# Patient Record
Sex: FEMALE | Race: ASIAN | NOT HISPANIC OR LATINO
[De-identification: names, ages, dates, MRNs, and addresses within clinical notes are randomized per-mention and may not be internally consistent; named-entity substitution may affect disease eponyms.]

---
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
## 2024-05-17 ENCOUNTER — APPOINTMENT (OUTPATIENT)
Dept: PEDIATRICS | Facility: CLINIC | Age: 19
End: 2024-05-17
Payer: COMMERCIAL

## 2024-05-17 VITALS
HEIGHT: 62.5 IN | TEMPERATURE: 99.9 F | HEART RATE: 84 BPM | WEIGHT: 183 LBS | OXYGEN SATURATION: 99 % | BODY MASS INDEX: 32.83 KG/M2

## 2024-05-17 DIAGNOSIS — J02.9 ACUTE PHARYNGITIS, UNSPECIFIED: ICD-10-CM

## 2024-05-17 DIAGNOSIS — F48.9 NONPSYCHOTIC MENTAL DISORDER, UNSPECIFIED: ICD-10-CM

## 2024-05-17 DIAGNOSIS — J02.0 STREPTOCOCCAL PHARYNGITIS: ICD-10-CM

## 2024-05-17 DIAGNOSIS — J32.9 CHRONIC SINUSITIS, UNSPECIFIED: ICD-10-CM

## 2024-05-17 DIAGNOSIS — F43.9 REACTION TO SEVERE STRESS, UNSPECIFIED: ICD-10-CM

## 2024-05-17 LAB — S PYO AG SPEC QL IA: POSITIVE

## 2024-05-17 PROCEDURE — 87880 STREP A ASSAY W/OPTIC: CPT | Mod: QW

## 2024-05-17 PROCEDURE — 99214 OFFICE O/P EST MOD 30 MIN: CPT

## 2024-05-17 RX ORDER — AMOXICILLIN 500 MG/1
500 TABLET, FILM COATED ORAL
Qty: 20 | Refills: 0 | Status: COMPLETED | COMMUNITY
Start: 2024-05-17 | End: 2024-05-27

## 2024-05-17 NOTE — HISTORY OF PRESENT ILLNESS
[de-identified] : sick [FreeTextEntry6] : 18y/o F p/w sore throat, runny nose, green-yellow mucus, facial pain, tactile fever x2d.  Reports sister had strep last week.   Also is interested in getting a psych evaluation.  She reports she is feeling stress and wants to know how to deal with it.  Her school gave her the name of a  to see, but pt needs a referral.

## 2024-05-17 NOTE — PHYSICAL EXAM
[Erythematous Oropharynx] : erythematous oropharynx [NL] : warm, clear [FreeTextEntry2] : discomfort with palpation of sinuses

## 2024-05-17 NOTE — DISCUSSION/SUMMARY
[FreeTextEntry1] : 19 year girl found to be rapid strep positive.  Complete 10 days of antibiotics. Side effect of antibiotics includes but not limited to diarrhea. Use antipyretics as needed. After being on antibiotics for atleast 24 hours patient less likely to spread infection.  Also with sinusitis.  Recommend nasal saline and blowing nose. - Return precautions reviewed. Patient to seek medical attention in ED if has decreased oral intake, decrease in wet diapers/voids, fever >100.4F, difficulty breathing, becomes lethargic, or has a change in mental status or alertness. To note if fever > 5 days must be seen immediately either in clinic or in ED.  Also referral given for  and psych given patient's request. - Mindfulness, Meditation and Mental health exercises reviewed. - Positive safe self calming methods reviewed including:  listening to music, journaling, physically removing self from situation and going on a short walk for example, yoga and physical activities, intentional breathing activities - If suicidal or homicidal ideations to seek immediate medical attention  Return/ED precautions given.  RTC routine and prn.  Caretaker expressed understanding and all questions answered.

## 2024-07-12 ENCOUNTER — APPOINTMENT (OUTPATIENT)
Dept: PEDIATRICS | Facility: CLINIC | Age: 19
End: 2024-07-12

## 2024-07-12 DIAGNOSIS — F43.9 REACTION TO SEVERE STRESS, UNSPECIFIED: ICD-10-CM

## 2024-07-12 DIAGNOSIS — F48.9 NONPSYCHOTIC MENTAL DISORDER, UNSPECIFIED: ICD-10-CM

## 2024-07-12 DIAGNOSIS — F41.9 ANXIETY DISORDER, UNSPECIFIED: ICD-10-CM

## 2024-07-12 PROCEDURE — 99214 OFFICE O/P EST MOD 30 MIN: CPT

## 2024-07-19 ENCOUNTER — TRANSCRIPTION ENCOUNTER (OUTPATIENT)
Age: 19
End: 2024-07-19

## 2024-07-22 PROBLEM — F41.9 ANXIETY: Status: ACTIVE | Noted: 2024-07-22

## 2024-07-31 ENCOUNTER — TRANSCRIPTION ENCOUNTER (OUTPATIENT)
Age: 19
End: 2024-07-31

## 2024-08-23 ENCOUNTER — APPOINTMENT (OUTPATIENT)
Dept: PEDIATRICS | Facility: CLINIC | Age: 19
End: 2024-08-23
Payer: COMMERCIAL

## 2024-08-23 VITALS
HEART RATE: 114 BPM | OXYGEN SATURATION: 99 % | WEIGHT: 175.4 LBS | TEMPERATURE: 98.5 F | BODY MASS INDEX: 32.28 KG/M2 | HEIGHT: 61.81 IN

## 2024-08-23 DIAGNOSIS — Z09 ENCOUNTER FOR FOLLOW-UP EXAMINATION AFTER COMPLETED TREATMENT FOR CONDITIONS OTHER THAN MALIGNANT NEOPLASM: ICD-10-CM

## 2024-08-23 DIAGNOSIS — F43.9 REACTION TO SEVERE STRESS, UNSPECIFIED: ICD-10-CM

## 2024-08-23 DIAGNOSIS — F48.9 NONPSYCHOTIC MENTAL DISORDER, UNSPECIFIED: ICD-10-CM

## 2024-08-23 DIAGNOSIS — F41.9 ANXIETY DISORDER, UNSPECIFIED: ICD-10-CM

## 2024-08-23 PROCEDURE — 99495 TRANSJ CARE MGMT MOD F2F 14D: CPT

## 2024-08-23 NOTE — HISTORY OF PRESENT ILLNESS
[de-identified] : psych f/u [FreeTextEntry6] : Met with patient alone and with father.  Went to ED on 8/6 for suicidal ideations.  Was evaluated by psych.  Per note, "Presentation is consistent with MDD. High functioning ASD should also be considered given patient's reported history and merits further evaluation."  Initially planned for inpatient psych admission.  Unremarkable CBC & CMP, negative serum pregnancy, negative COVID/Flu/RSV, normal blood tox screen.  Was held in CDU until 8/7 when she was discharged.  Per note, has been scheduled at [facility] adult behavioral health outpatient clinic on 09/12/2024 at 10:30 AM ([phone]).  Per her documents, she received a dose of lorazepam which she reports made her feel "weird" and like she was in a fog.  She reports that she does not want to go to a psych mejia.  She continues to have a difficult relationship with her family since her hospital discharge.  She is willing to go to her appointment on 9/12.  She denies any SI or HI.  Following gathered & recorded by JORGE Bertrand: F/U appointment:  f/u from the emergency department. Was told she has MMD. Spoke about how she feels regarding her time at the ED, stated that her mother was in a rush to get home because she needs to take care of her brother (brother was dx with autism and has behavioral issues pp). Continues to explain that more issues have occurred because of her father returned to the states and is very hard on her. Says things like: "You're 19 years old and can't do anything, you're psycho.", "I'm not paying for college anymore.", "You are always causing problems", "I'm so embarrassed by you. I can't believe all you told the doctors. You're going to be alone for this." Pt heard parents speaking negatively of her.  Spoke to dad, he believes her to be fine and thinks she's acting this way because she failed last semester in college, and she doesn't get along with her brother. Dad acknowledges that the brother and Lucien don't like each other. Stated that if they can't get along that he will take one of them with him to school out of the country or put Lucien in a dorm at her college. Said that the psychiatrist never called him and that's why he wanted her to go to the ER. Dad then went off tangent speaking about his schooling, wanting to do residency here, how he was a former  and his surgeries.

## 2024-08-23 NOTE — DISCUSSION/SUMMARY
[FreeTextEntry1] : 20y/o F presenting for follow up of psych hold at [facility].  JORGE Bertrand also spoke with both patient and dad for a significant amount of time in addition to my speaking with them.  GURDEEP denies active SI & HI.  Still with difficult home life.  Has psych follow up 9/12 which she and dad agree she will go to.  Advised dad and GURDEEP that she may require many appointments and that they should go to all of them and continue her psychiatric care.  Return/ED precautions given.  Call 911 if having any suicidal thoughts.  RTC routine and prn.  Caretaker expressed understanding and all questions answered.

## 2024-08-23 NOTE — HISTORY OF PRESENT ILLNESS
[de-identified] : psych f/u [FreeTextEntry6] : Met with patient alone and with father.  Went to ED on 8/6 for suicidal ideations.  Was evaluated by psych.  Per note, "Presentation is consistent with MDD. High functioning ASD should also be considered given patient's reported history and merits further evaluation."  Initially planned for inpatient psych admission.  Unremarkable CBC & CMP, negative serum pregnancy, negative COVID/Flu/RSV, normal blood tox screen.  Was held in CDU until 8/7 when she was discharged.  Per note, has been scheduled at [facility] adult behavioral health outpatient clinic on 09/12/2024 at 10:30 AM ([phone]).  Per her documents, she received a dose of lorazepam which she reports made her feel "weird" and like she was in a fog.  She reports that she does not want to go to a psych mejia.  She continues to have a difficult relationship with her family since her hospital discharge.  She is willing to go to her appointment on 9/12.  She denies any SI or HI.  Following gathered & recorded by JORGE Bertrand: F/U appointment:  f/u from the emergency department. Was told she has MMD. Spoke about how she feels regarding her time at the ED, stated that her mother was in a rush to get home because she needs to take care of her brother (brother was dx with autism and has behavioral issues pp). Continues to explain that more issues have occurred because of her father returned to the states and is very hard on her. Says things like: "You're 19 years old and can't do anything, you're psycho.", "I'm not paying for college anymore.", "You are always causing problems", "I'm so embarrassed by you. I can't believe all you told the doctors. You're going to be alone for this." Pt heard parents speaking negatively of her.  Spoke to dad, he believes her to be fine and thinks she's acting this way because she failed last semester in college, and she doesn't get along with her brother. Dad acknowledges that the brother and Lucien don't like each other. Stated that if they can't get along that he will take one of them with him to school out of the country or put Lucien in a dorm at her college. Said that the psychiatrist never called him and that's why he wanted her to go to the ER. Dad then went off tangent speaking about his schooling, wanting to do residency here, how he was a former  and his surgeries.

## 2024-08-23 NOTE — DISCUSSION/SUMMARY
[FreeTextEntry1] : 18y/o F presenting for follow up of psych hold at [facility].  JORGE Bertrand also spoke with both patient and dad for a significant amount of time in addition to my speaking with them.  GURDEEP denies active SI & HI.  Still with difficult home life.  Has psych follow up 9/12 which she and dad agree she will go to.  Advised dad and GURDEEP that she may require many appointments and that they should go to all of them and continue her psychiatric care.  Return/ED precautions given.  Call 911 if having any suicidal thoughts.  RTC routine and prn.  Caretaker expressed understanding and all questions answered.

## 2024-09-12 ENCOUNTER — APPOINTMENT (OUTPATIENT)
Dept: PSYCHIATRY | Facility: CLINIC | Age: 19
End: 2024-09-12